# Patient Record
Sex: FEMALE | URBAN - METROPOLITAN AREA
[De-identification: names, ages, dates, MRNs, and addresses within clinical notes are randomized per-mention and may not be internally consistent; named-entity substitution may affect disease eponyms.]

---

## 2018-10-24 ENCOUNTER — OFFICE VISIT (OUTPATIENT)
Dept: URBAN - METROPOLITAN AREA CLINIC 88 | Facility: CLINIC | Age: 48
End: 2018-10-24
Payer: MEDICARE

## 2018-10-24 DIAGNOSIS — H11.041 PERIPHERAL PTERYGIUM, STATIONARY, RIGHT EYE: ICD-10-CM

## 2018-10-24 DIAGNOSIS — H52.13 MYOPIA, BILATERAL: ICD-10-CM

## 2018-10-24 DIAGNOSIS — H25.13 AGE-RELATED NUCLEAR CATARACT, BILATERAL: Primary | ICD-10-CM

## 2018-10-24 DIAGNOSIS — H11.152 PINGUECULA, LEFT EYE: ICD-10-CM

## 2018-10-24 PROCEDURE — 99214 OFFICE O/P EST MOD 30 MIN: CPT | Performed by: OPHTHALMOLOGY

## 2018-10-24 ASSESSMENT — KERATOMETRY
OS: 44.13
OD: 44.25

## 2018-10-24 ASSESSMENT — INTRAOCULAR PRESSURE
OS: 14
OD: 14

## 2018-10-24 ASSESSMENT — VISUAL ACUITY
OS: 20/25
OD: 20/25

## 2018-10-24 NOTE — IMPRESSION/PLAN
Impression: Peripheral pterygium, stationary, right eye: H11.041 OD. Plan: Discussed diagnosis in detail with patient. No treatment is required at this time. UV protection recommended.